# Patient Record
Sex: FEMALE | Race: WHITE
[De-identification: names, ages, dates, MRNs, and addresses within clinical notes are randomized per-mention and may not be internally consistent; named-entity substitution may affect disease eponyms.]

---

## 2020-12-20 ENCOUNTER — HOSPITAL ENCOUNTER (EMERGENCY)
Dept: HOSPITAL 39 - ER | Age: 35
Discharge: HOME | End: 2020-12-20
Payer: COMMERCIAL

## 2020-12-20 VITALS — OXYGEN SATURATION: 100 %

## 2020-12-20 VITALS — DIASTOLIC BLOOD PRESSURE: 71 MMHG | SYSTOLIC BLOOD PRESSURE: 103 MMHG | TEMPERATURE: 97.5 F

## 2020-12-20 DIAGNOSIS — N83.11: ICD-10-CM

## 2020-12-20 DIAGNOSIS — Z87.891: ICD-10-CM

## 2020-12-20 DIAGNOSIS — N73.9: Primary | ICD-10-CM

## 2020-12-20 PROCEDURE — 74177 CT ABD & PELVIS W/CONTRAST: CPT

## 2020-12-20 PROCEDURE — 83690 ASSAY OF LIPASE: CPT

## 2020-12-20 PROCEDURE — 87210 SMEAR WET MOUNT SALINE/INK: CPT

## 2020-12-20 PROCEDURE — 82150 ASSAY OF AMYLASE: CPT

## 2020-12-20 PROCEDURE — 81025 URINE PREGNANCY TEST: CPT

## 2020-12-20 PROCEDURE — 87591 N.GONORRHOEAE DNA AMP PROB: CPT

## 2020-12-20 PROCEDURE — 85025 COMPLETE CBC W/AUTO DIFF WBC: CPT

## 2020-12-20 PROCEDURE — 80053 COMPREHEN METABOLIC PANEL: CPT

## 2020-12-20 PROCEDURE — 87491 CHLMYD TRACH DNA AMP PROBE: CPT

## 2020-12-20 PROCEDURE — 74019 RADEX ABDOMEN 2 VIEWS: CPT

## 2020-12-20 PROCEDURE — 81001 URINALYSIS AUTO W/SCOPE: CPT

## 2020-12-20 PROCEDURE — 36415 COLL VENOUS BLD VENIPUNCTURE: CPT

## 2020-12-20 NOTE — RAD
EXAM: Abdomen Flat   Upright



CLINICAL INDICATION: 35-year-old female with lower abdominal

pain.



TECHNIQUE: Single supine view of the abdomen was obtained.



COMPARISON: 4/27/2008.



FINDINGS:



Gas is seen within normal caliber small and large bowel. Few

nonspecific air-filled small bowel loops present within the

central abdomen. No free air is identified.

There are no abnormal calcifications. The osseous structures

reveal S-shaped curvature of the thoracolumbar spine.

The lung bases are clear. 



IMPRESSION:



Nonspecific abdominal bowel gas pattern. 



Electronically signed by:  Radha Rodriguez MD  12/20/2020 7:18 PM [facility]

Workstation: [id number]

## 2020-12-20 NOTE — ED.PDOC
History of Present Illness





- General


Chief Complaint: Abdominal Pain


Stated Complaint: Lower abd/pelvic region pain


Time Seen by Provider: 20 16:21


Source: patient


Exam Limitations: no limitations





- History of Present Illness


Initial Comments: 





The patient is a 35-year-old  female presented emergency room secondary

to lower abdominal right lower quadrant pain.  She is having cramping like 

contractions.  No abnormal bleeding.  No fever.  No nausea or vomiting.  No 

syncope.  It is not worse with.  It is worse with palpation.  Mild increased 

vaginal discharge.


Timing/Duration: unsure, other - Around 3 days but much worse today


Severity: severe


Improving Factors: immobilization


Worsening Factors: movement


Associated Symptoms: denies symptoms


Allergies/Adverse Reactions: 


Allergies





NO KNOWN ALLERGY Allergy (Verified 20 16:18)


   





Home Medications: 


Ambulatory Orders





Doxycycline Hyclate 100 mg PO BID #20 cap 20 











Review of Systems





- Review of Systems


Constitutional: States: no symptoms reported


EENTM: States: no symptoms reported


Respiratory: States: no symptoms reported


Cardiology: States: no symptoms reported


Gastrointestinal/Abdominal: States: abdominal pain, nausea


Genitourinary: States: discharge, pain


Musculoskeletal: States: no symptoms reported


Skin: States: no symptoms reported


Neurological: States: no symptoms reported


Endocrine: States: no symptoms reported


All other Systems: No Change from Baseline





Past Medical History (General)





- Patient Medical History


Hx Seizures: No


Hx Stroke: No


Hx Asthma: No


Hx of COPD: No


Hx Cardiac Disorders: No


Hx Congestive Heart Failure: No


Hx Pacemaker: No


Hx Hypertension: No


Hx Diabetes: No


Hx Cancer: No


Hx Hepatitis C: No


Hx MRSA: No


Surgical History: other





- Vaccination History


Hx Tetanus, Diphtheria Vaccination: No


Hx Influenza Vaccination: No


Hx Pneumococcal Vaccination: No





- Social History


Hx Tobacco Use: Yes


Hx Chewing Tobacco Use: No


Hx Alcohol Use: No


Hx Substance Use: No


Hx Substance Use Treatment: No


Hx Depression: No


Hx Physical Abuse: No


Hx Emotional Abuse: No


Hx Suspected Abuse: No





- Female History


Patient is a Female of Child Bearing Age (10 -59 yrs old): Yes


Hx Last Menstrual Period: 12


Patient Pregnant: No





Family Medical History





- Family History


  ** Father


Family History: Unknown


Living Status: 


Age at Death (years of age): 57


Hx Family Cancer: Yes





Physical Exam





- Physical Exam


General Appearance: Alert, No apparent distress, Other - Uncomfortable


Eye Exam: bilateral normal


Ears, Nose, Throat: hearing grossly normal, normal ENT inspection


Neck: full range of motion, supple


Respiratory: lungs clear, normal breath sounds, no respiratory distress, no 

accessory muscle use


Cardiovascular/Chest: normal peripheral pulses, regular rate, rhythm, no edema


Peripheral Pulses: radial,right: 2+, radial,left: 2+


Gastrointestinal/Abdominal: soft, other - Suprapubic to right lower quadrant 

discomfort to palpation.


Rectal Exam: deferred, other - Pelvic exam shows cervical motion tenderness as 

well as fundal tenderness to palpation and right adnexal tenderness to 

palpation.  The patient does have additionally some increased vaginal discharge.


Back Exam: no CVA tenderness, no vertebral tenderness


Extremity: normal range of motion, non-tender, normal inspection, no pedal 

edema, normal capillary refill


Neurologic: alert, normal mood/affect, oriented x 3


Skin Exam: normal color


Comments: 





                               Vital Signs - 24 hr











  20





  16:08 16:14 17:00


 


Temperature  97.8 F 


 


Pulse Rate [ 82 82 65





Pulse ox]   


 


Respiratory 18 18 20





Rate   


 


Blood Pressure  108/82 102/73





[L arm]   


 


O2 Sat by Pulse  100 100





Oximetry   














  20





  18:00


 


Temperature 98.1 F


 


Pulse Rate [ 66





Pulse ox] 


 


Respiratory 18





Rate 


 


Blood Pressure 96/72





[L arm] 


 


O2 Sat by Pulse 98





Oximetry 














Progress





- Progress


Progress: 





20 20:33


The patient is a 35-year-old  female presented emergency room secondary

to pelvic pain.  Based upon exam the patient does appear to have pelvic 

inflammatory disease.  Gonorrhea and Chlamydia tests are a send out.  The 

patient is being treated empirically with a dose of Rocephin here and 10 days of

oral doxycycline.  She did also receive a dose of Diflucan here.  The patient 

can take Aleve 2 tablets twice daily for the next few days.  She needs to keep 

her self well-hydrated.  No evidence of any abscess formation on CT scan.  No 

evidence of any appendicitis on CT scan.  Lab work is otherwise reassuring at 

this time.  ER warnings are given for any obvious worsening.  She does need to 

follow-up with a gynecologist in the near future.





juhi cordero [id number]





- Results/Orders


Results/Orders: 





                                        





20 19:25


GC CHLAMYDIA RNA,TMA Stat 





CT scan of the abdomen pelvis shows no obvious acute pathology.  She does have a

1.2 cm corpus luteum cyst.  No obvious visible abnormality of the uterus, 

fallopian tubes or ovaries otherwise.  No evidence of appendicitis.








                         Laboratory Results - last 24 hr











  20





  16:21 16:32 16:32


 


WBC   7.6 


 


RBC   4.01 L 


 


Hgb   12.6 


 


Hct   36.1 


 


MCV   89.9 


 


MCH   31.4 H 


 


MCHC   34.9 


 


RDW   12.9 


 


Plt Count   239 


 


MPV   9.0 


 


Absolute Neuts (auto)   4.30 


 


Absolute Lymphs (auto)   2.50 


 


Absolute Monos (auto)   0.70 


 


Absolute Eos (auto)   0.00 


 


Absolute Basos (auto)   0.00 


 


Neutrophils %   57.1 


 


Lymphocytes %   32.8 


 


Monocytes %   9.0 


 


Eosinophils %   0.6 L 


 


Basophils %   0.5 


 


Sodium    136


 


Potassium    3.9


 


Chloride    100 L


 


Carbon Dioxide    24


 


Anion Gap    15.9


 


BUN    11


 


Creatinine    0.70


 


BUN/Creatinine Ratio    15.7


 


Random Glucose    93


 


Serum Osmolality    271.1 L


 


Calcium    8.9


 


Total Bilirubin    0.5


 


AST    14


 


ALT    14


 


Alkaline Phosphatase    34 L


 


Serum Total Protein    7.9


 


Albumin    4.4


 


Globulin    3.5


 


Albumin/Globulin Ratio    1.3


 


Amylase    60


 


Lipase    36


 


Urine Color   


 


Urine Appearance   


 


Urine pH   


 


Ur Specific Gravity   


 


Urine Protein   


 


Urine Glucose (UA)   


 


Urine Ketones   


 


Urine Blood   


 


Urine Nitrite   


 


Urine Bilirubin   


 


Urine Urobilinogen   


 


Ur Leukocyte Esterase   


 


Urine RBC   


 


Urine WBC   


 


Ur Epithelial Cells   


 


Urine Bacteria   


 


Urine HCG, Qual  Negative  














  20





  17:12


 


WBC 


 


RBC 


 


Hgb 


 


Hct 


 


MCV 


 


MCH 


 


MCHC 


 


RDW 


 


Plt Count 


 


MPV 


 


Absolute Neuts (auto) 


 


Absolute Lymphs (auto) 


 


Absolute Monos (auto) 


 


Absolute Eos (auto) 


 


Absolute Basos (auto) 


 


Neutrophils % 


 


Lymphocytes % 


 


Monocytes % 


 


Eosinophils % 


 


Basophils % 


 


Sodium 


 


Potassium 


 


Chloride 


 


Carbon Dioxide 


 


Anion Gap 


 


BUN 


 


Creatinine 


 


BUN/Creatinine Ratio 


 


Random Glucose 


 


Serum Osmolality 


 


Calcium 


 


Total Bilirubin 


 


AST 


 


ALT 


 


Alkaline Phosphatase 


 


Serum Total Protein 


 


Albumin 


 


Globulin 


 


Albumin/Globulin Ratio 


 


Amylase 


 


Lipase 


 


Urine Color  Yellow


 


Urine Appearance  Sl cloudy


 


Urine pH  5.5


 


Ur Specific Gravity  >= 1.030


 


Urine Protein  Trace


 


Urine Glucose (UA)  Negative


 


Urine Ketones  Trace


 


Urine Blood  Trace-intact H


 


Urine Nitrite  Negative


 


Urine Bilirubin  Negative


 


Urine Urobilinogen  0.2


 


Ur Leukocyte Esterase  Negative


 


Urine RBC  1-3


 


Urine WBC  0-1


 


Ur Epithelial Cells  20-30


 


Urine Bacteria  2+ H


 


Urine HCG, Qual 








Wet prep shows a fair number of white blood cells.  No trichomonas.  No obvious 

yeast.





Departure





- Departure


Clinical Impression: 


 Pelvic inflammatory disease (PID)





Disposition: Discharge to Home or Self Care


Condition: Fair


Departure Forms:  ED Discharge - Pt. Copy, Patient Portal Self Enrollment


Instructions:  DI for Abdominal Pain-Adult, Pelvic Inflammatory Disease (DC)


Diet: regular diet


Activity: increase activity as tolerated


Prescriptions: 


Doxycycline Hyclate 100 mg PO BID #20 cap


Home Medications: 


Ambulatory Orders





Doxycycline Hyclate 100 mg PO BID #20 cap 20 








Additional Instructions: 


The patient is a 35-year-old  female presented emergency room secondary

to pelvic pain.  Based upon exam the patient does appear to have pelvic 

inflammatory disease.  Gonorrhea and Chlamydia tests are a send out.  The 

patient is being treated empirically with a dose of Rocephin here and 10 days of

oral doxycycline.  She did also receive a dose of Diflucan here.  The patient 

can take Aleve 2 tablets twice daily for the next few days.  She needs to keep 

her self well-hydrated.  No evidence of any abscess formation on CT scan.  No 

evidence of any appendicitis on CT scan.  Ultrasound was not available for the 

patient tonight.  Lab work is otherwise reassuring at this time.  ER warnings 

are given for any obvious worsening.  She does need to follow-up with a 

gynecologist in the near future.

## 2020-12-20 NOTE — CT
EXAM:  CT Abdomen and Pelvis With Intravenous Contrast



CLINICAL HISTORY:  The patient is 35 years old and is Female;

lower abd pain



TECHNIQUE:  Axial computed tomography images of the abdomen and

pelvis with intravenous contrast.  Sagittal and coronal

reformatted images were created and reviewed.  This CT exam was

performed using one or more of the following dose reduction

techniques:  automated exposure control, adjustment of the mA

and/or kV according to patient size, and/or use of iterative

reconstruction technique.



COMPARISON: Jacki 3, 2014 CT abdomen and pelvis with contrast



FINDINGS:

  Lung bases:  Unremarkable.  No mass.  No consolidation.



 ABDOMEN:

  Liver:  Unremarkable.  No mass.

  Gallbladder and bile ducts:  Unremarkable.  No calcified

stones.  No ductal dilation.

  Pancreas:  No findings to suggest acute pancreatitis. No mass

visualized.  No ductal dilation.

  Spleen:  Unremarkable.  No splenomegaly.

  Adrenals:  Unremarkable.  No mass.

  Kidneys and ureters:  Unremarkable.  No solid mass.  No

hydronephrosis.

  Stomach and bowel:  No bowel dilatation or obstruction. No

bowel wall thickening.



 PELVIS:

  Appendix:  The visualized appendix is normal. No pericecal

inflammation to suggest acute appendicitis.

  Bladder:  Unremarkable.  No mass.

  Reproductive:  1.2 cm right ovarian corpus luteum.

      Uterus and left ovary are unremarkable.



 ABDOMEN and PELVIS:

  Intraperitoneal space:  Free fluid in the cul-de-sac, likely

physiologic.

      No free air.

  Bones/joints:  No acute fracture visualized.

      No dislocation.

  Soft tissues:  Unremarkable.

  Vasculature:  Unremarkable.  No abdominal aortic aneurysm.

  Lymph nodes:  No pathologically enlarged lymph nodes.



IMPRESSION:     

1.  No acute obstructive or inflammatory process identified.

Normal appendix.

2.  1.2 cm right ovarian corpus luteum. No follow-up imaging

recommended.



Electronically signed by:  Taylor Aiken MD  12/20/2020 8:26

PM [facility] Workstation: [id number]